# Patient Record
Sex: FEMALE | URBAN - METROPOLITAN AREA
[De-identification: names, ages, dates, MRNs, and addresses within clinical notes are randomized per-mention and may not be internally consistent; named-entity substitution may affect disease eponyms.]

---

## 2017-05-26 ENCOUNTER — HISTORICAL (OUTPATIENT)
Dept: ADMINISTRATIVE | Facility: HOSPITAL | Age: 32
End: 2017-05-26

## 2017-05-26 LAB
GLUCOSE 1H P 100 G GLC PO SERPL-MCNC: 129 MG/DL (ref 100–180)
GLUCOSE 2H P 100 G GLC PO SERPL-MCNC: 103 MG/DL (ref 70–140)
GLUCOSE 3H P 100 G GLC PO SERPL-MCNC: 59 MG/DL (ref 70–115)
GLUCOSE BS SERPL-MCNC: 81 MG/DL (ref 70–115)

## 2017-06-12 ENCOUNTER — HOSPITAL ENCOUNTER (OUTPATIENT)
Dept: OBSTETRICS AND GYNECOLOGY | Facility: HOSPITAL | Age: 32
End: 2017-06-12
Attending: OBSTETRICS & GYNECOLOGY | Admitting: OBSTETRICS & GYNECOLOGY

## 2017-08-04 ENCOUNTER — HOSPITAL ENCOUNTER (OUTPATIENT)
Dept: OBSTETRICS AND GYNECOLOGY | Facility: HOSPITAL | Age: 32
End: 2017-08-04
Attending: OBSTETRICS & GYNECOLOGY | Admitting: OBSTETRICS & GYNECOLOGY

## 2021-11-09 ENCOUNTER — HOSPITAL ENCOUNTER (OUTPATIENT)
Dept: OBSTETRICS AND GYNECOLOGY | Facility: HOSPITAL | Age: 36
End: 2021-11-12
Attending: OBSTETRICS & GYNECOLOGY | Admitting: OBSTETRICS & GYNECOLOGY

## 2021-11-09 LAB
ABS NEUT (OLG): 8.54 X10(3)/MCL (ref 2.1–9.2)
ALBUMIN SERPL-MCNC: 3.4 GM/DL (ref 3.5–5)
ALBUMIN/GLOB SERPL: 0.9 RATIO (ref 1.1–2)
ALP SERPL-CCNC: 70 UNIT/L (ref 40–150)
ALT SERPL-CCNC: 9 UNIT/L (ref 0–55)
APPEARANCE, UA: ABNORMAL
AST SERPL-CCNC: 14 UNIT/L (ref 5–34)
B-HCG SERPL QL: NEGATIVE
BACTERIA SPEC CULT: ABNORMAL /HPF
BASOPHILS # BLD AUTO: 0 X10(3)/MCL (ref 0–0.2)
BASOPHILS NFR BLD AUTO: 0 %
BILIRUB SERPL-MCNC: 0.4 MG/DL
BILIRUB UR QL STRIP: NEGATIVE
BILIRUBIN DIRECT+TOT PNL SERPL-MCNC: 0.2 MG/DL (ref 0–0.5)
BILIRUBIN DIRECT+TOT PNL SERPL-MCNC: 0.2 MG/DL (ref 0–0.8)
BUN SERPL-MCNC: 8.7 MG/DL (ref 7–18.7)
CALCIUM SERPL-MCNC: 9.4 MG/DL (ref 8.7–10.5)
CHLORIDE SERPL-SCNC: 104 MMOL/L (ref 98–107)
CO2 SERPL-SCNC: 25 MMOL/L (ref 22–29)
COLOR UR: ABNORMAL
CREAT SERPL-MCNC: 0.77 MG/DL (ref 0.55–1.02)
EOSINOPHIL # BLD AUTO: 0.1 X10(3)/MCL (ref 0–0.9)
EOSINOPHIL NFR BLD AUTO: 1 %
ERYTHROCYTE [DISTWIDTH] IN BLOOD BY AUTOMATED COUNT: 14.6 % (ref 11.5–17)
GLOBULIN SER-MCNC: 3.6 GM/DL (ref 2.4–3.5)
GLUCOSE (UA): NEGATIVE
GLUCOSE SERPL-MCNC: 131 MG/DL (ref 74–100)
HCT VFR BLD AUTO: 42.1 % (ref 37–47)
HGB BLD-MCNC: 13.4 GM/DL (ref 12–16)
HGB UR QL STRIP: NEGATIVE
KETONES UR QL STRIP: ABNORMAL
LEUKOCYTE ESTERASE UR QL STRIP: NEGATIVE
LYMPHOCYTES # BLD AUTO: 4.6 X10(3)/MCL (ref 0.6–4.6)
LYMPHOCYTES NFR BLD AUTO: 33 %
MCH RBC QN AUTO: 27 PG (ref 27–31)
MCHC RBC AUTO-ENTMCNC: 31.8 GM/DL (ref 33–36)
MCV RBC AUTO: 84.9 FL (ref 80–94)
MONOCYTES # BLD AUTO: 0.7 X10(3)/MCL (ref 0.1–1.3)
MONOCYTES NFR BLD AUTO: 5 %
MUCOUS THREADS URNS QL MICRO: ABNORMAL
NEUTROPHILS # BLD AUTO: 8.54 X10(3)/MCL (ref 2.1–9.2)
NEUTROPHILS NFR BLD AUTO: 61 %
NITRITE UR QL STRIP: NEGATIVE
PH UR STRIP: 5.5 [PH] (ref 5–9)
PLATELET # BLD AUTO: 336 X10(3)/MCL (ref 130–400)
PMV BLD AUTO: 11.5 FL (ref 9.4–12.4)
POTASSIUM SERPL-SCNC: 3.4 MMOL/L (ref 3.5–5.1)
PROT SERPL-MCNC: 7 GM/DL (ref 6.4–8.3)
PROT UR QL STRIP: NEGATIVE
RBC # BLD AUTO: 4.96 X10(6)/MCL (ref 4.2–5.4)
RBC #/AREA URNS HPF: ABNORMAL /[HPF]
SARS-COV-2 AG RESP QL IA.RAPID: NEGATIVE
SODIUM SERPL-SCNC: 139 MMOL/L (ref 136–145)
SP GR UR STRIP: 1.03 (ref 1–1.03)
SQUAMOUS EPITHELIAL, UA: ABNORMAL /HPF
UA WBC MAN: ABNORMAL /HPF
UROBILINOGEN UR STRIP-ACNC: 1
WBC # SPEC AUTO: 14 X10(3)/MCL (ref 4.5–11.5)

## 2021-11-10 LAB — FINAL CULTURE: NORMAL

## 2021-11-11 LAB
ERYTHROCYTE [DISTWIDTH] IN BLOOD BY AUTOMATED COUNT: 14.9 % (ref 11.5–17)
HCT VFR BLD AUTO: 29.1 % (ref 37–47)
HCT VFR BLD AUTO: 30.8 % (ref 37–47)
HGB BLD-MCNC: 9.3 GM/DL (ref 12–16)
HGB BLD-MCNC: 9.9 GM/DL (ref 12–16)
MCH RBC QN AUTO: 27.8 PG (ref 27–31)
MCHC RBC AUTO-ENTMCNC: 32.1 GM/DL (ref 33–36)
MCV RBC AUTO: 86.5 FL (ref 80–94)
PLATELET # BLD AUTO: 194 X10(3)/MCL (ref 130–400)
PMV BLD AUTO: 11.4 FL (ref 9.4–12.4)
RBC # BLD AUTO: 3.56 X10(6)/MCL (ref 4.2–5.4)
WBC # SPEC AUTO: 5.3 X10(3)/MCL (ref 4.5–11.5)

## 2021-11-12 LAB
ABS NEUT (OLG): 2.02 X10(3)/MCL (ref 2.1–9.2)
BASOPHILS # BLD AUTO: 0 X10(3)/MCL (ref 0–0.2)
BASOPHILS NFR BLD AUTO: 0 %
EOSINOPHIL # BLD AUTO: 0.3 X10(3)/MCL (ref 0–0.9)
EOSINOPHIL NFR BLD AUTO: 7 %
ERYTHROCYTE [DISTWIDTH] IN BLOOD BY AUTOMATED COUNT: 15 % (ref 11.5–17)
HCT VFR BLD AUTO: 29.9 % (ref 37–47)
HGB BLD-MCNC: 9.3 GM/DL (ref 12–16)
LYMPHOCYTES # BLD AUTO: 1.7 X10(3)/MCL (ref 0.6–4.6)
LYMPHOCYTES NFR BLD AUTO: 39 %
MCH RBC QN AUTO: 27.1 PG (ref 27–31)
MCHC RBC AUTO-ENTMCNC: 31.1 GM/DL (ref 33–36)
MCV RBC AUTO: 87.2 FL (ref 80–94)
MONOCYTES # BLD AUTO: 0.3 X10(3)/MCL (ref 0.1–1.3)
MONOCYTES NFR BLD AUTO: 6 %
NEUTROPHILS # BLD AUTO: 2.02 X10(3)/MCL (ref 2.1–9.2)
NEUTROPHILS NFR BLD AUTO: 46 %
PLATELET # BLD AUTO: 202 X10(3)/MCL (ref 130–400)
PMV BLD AUTO: 11.7 FL (ref 9.4–12.4)
RBC # BLD AUTO: 3.43 X10(6)/MCL (ref 4.2–5.4)
WBC # SPEC AUTO: 4.3 X10(3)/MCL (ref 4.5–11.5)

## 2022-04-30 NOTE — OP NOTE
Patient:   Rachell Trinidad            MRN: [id number]            FIN: [id number]               Age:   36 years     Sex:  Female     :  1985   Associated Diagnoses:   Endometriosis of both ovaries; Abdominal pain; Hemorrhagic ovarian cyst; Intractable abdominal pain; Ovarian cyst, complex; Pelvic pain in female   Author:   Jonathan Goss MD      Operative Note   Operative Information   Date/ Time:  11/10/2021 13:41:00.     Indications: Pelvic pain  Left ovarian cyst.     Preoperative Diagnosis: Endometriosis of both ovaries (XGA96-XN N80.1).     Postoperative Diagnosis: Abdominal pain (PNED [id number]), Endometriosis of both ovaries (WQP11-TA N80.1), Hemorrhagic ovarian cyst (XNY66-GZ N83.209), Intractable abdominal pain (GNI12-MH R10.9), Ovarian cyst, complex (NSC28-QI N83.299), Pelvic pain in female (HEK13-PG R10.2).     Surgeon: Jonathan Goss MD.     Speciman Removed: Left ovary and tube.     Description of Procedure/Findings/    Complications: The patient was brought to the operating room and placed on the operating table where she underwent a general anesthetic induction.  The patient was placed in a frog-leg position on the operating table.  The abdomen and perineum were then prepped and draped.  A uterine manipulator was then placed into the intrauterine cavity and the patient was then placed in the supine position.  Hess catheter was also placed into the bladder at this time.  An intraumbilical skin incision was then made.  A varies needle was inserted and 2 L of CO2 were insufflated into the abdominal cavity.  The varies needle was removed and a 10 mm trocar was placed through the umbilicus into the abdomen under direct visualization with the laparoscope.  Skin and third punctures were made in the right left lower abdominal quadrants with 5 mm trochars.  A fourth puncture was made suprapubically with a 10 mm trocar.  The pelvis was then inspected.  The uterus  was noted to be displaced anteriorly.  There was a large mass arising from the left ovary that was extending from the left pelvic sidewall across to the midline.  There was a large amount of hemosiderin laden fluid in the anterior and posterior cul-de-sacs as well as on the right abdominal gutter all the way up to the liver bed.  Autografts were taken to identify these findings.  The findings are very suggestive of endometriosis.  Inspection of the left ovary on the backside of the uterus where it was adhered revealed that there was some leakage of fluid from the ovary into the abdomen.  Cul-de-sac had numerous adhesions of the left ovary as well as the right ovary.  We then performed careful blunt and sharp dissection of all these adhesions of the left ovary to free it from the pelvic sidewall and the posterior aspect of the uterus.  We were able to capture the left gonadal ovary and vein with the bipolar cautery and then ligated the vessels with the harmonic scalpel.  We then dissected the left fallopian tube free.  We used the harmonic scalpel to dissected and separate the tube.  We then ligated the utero-ovarian pedicle on the left side.  We then carefully dissected the remainder of the ovary and left tube from the left pelvic sidewall.  The right ovary was inspected it was noted to have a large endometrioma which we drained as well.  Then inspected the pelvis carefully for hemostasis.  We used Vistaseal sprayed into the area of dissection for added hemostasis.  We then placed powdered Surgicel as well.  We retrieved the surgical specimen through the suprapubic trocar site.  After completing the procedure the instruments were removed from the abdomen.  The excess CO2 was expressed to the trochars.  The trochars were then removed.  The incisions were closed with 4-0 Monocryl in subcuticular stitches.  Dressings were placed.  The patient then awoke from the anesthetic and was transferred to the recovery room in  stable condition.

## 2022-04-30 NOTE — DISCHARGE SUMMARY
Patient:   Rachell Trinidad            MRN: [id number]            FIN: [id number]               Age:   36 years     Sex:  Female     :  1985   Associated Diagnoses:   None   Author:   Jonathan Goss MD      This patient was admitted to the hospital through the emergency room on Tuesday evening.  The patient had a large left ovarian cyst which was  found to be a large endometrioma.  Laparoscopic surgery was performed and a left salpingo-oophorectomy was achieved to remove the endometrioma.  Postoperatively the patient has done well she has had some pain over the last 48 hours.  The pressure has also been running and 95/50.  Repeat H&H's have been stable.  White count has dropped from 14,000-5000.  An ultrasound of the pelvis and abdomen was performed today which was negative.  Plan at this time is to discharge the patient to home in improved condition should be on a regular diet with limited activities.  She will have follow-up in the office on Wednesday.  Her discharge diagnosis abdominal pain left ovarian endometrioma.  Procedure performed laparoscopic left salpingo-oophorectomy

## 2022-04-30 NOTE — ED PROVIDER NOTES
Patient:   Rachell Trinidad            MRN: [id number]            FIN: [id number]               Age:   36 years     Sex:  Female     :  1985   Associated Diagnoses:   Hemorrhagic ovarian cyst; Intractable abdominal pain; Abdominal pain   Author:   Rony SUÁREZ, Brayan BEEBE      Report Summary       General:       Alert, no acute distress.       Basic Information   Additional information: Chief Complaint from Nursing Triage Note : Chief Complaint   2021 5:43 CST       Chief Complaint           pt. C/o lower bilat abd pain started last night worse this morning.. denies dysuria , fever, bleeding .. lbm yesterday  .      History of Present Illness   The patient presents with abdominal pain.  The onset was abrupt and Last night around 9 PM, acutely.  The course/duration of symptoms is constant and worsening.  The character of symptoms is sharp.  The degree at onset was minimal.  The Location of pain at onset was right, lower and abdominal.  The degree at present is severe.  The Location of pain at present is right and abdominal.  Radiating pain: right flank. There are exacerbating factors including changing position and movement.  Vomiting is not a relieving factor.  Therapy today: Tylenol this morning without any improvement..  Associated symptoms: none.  36-year-old female with a prior history of renal stone as well as status post gastric bypass here for right lower quadrant abdominal pain started at 9 PM last night abruptly, states that she was able to sleep for a few hours after taking Tylenol but this morning started having worsening pain around 4 AM and has been persistent since then, denies any nausea vomiting or fevers.  Does note that now the right lower quadrant pain is up into the right upper quadrant and radiating to the right flank with it out any changes in urinary habits..        Review of Systems   Constitutional symptoms:  Negative except as documented in HPI.   Skin symptoms:  Negative except as  documented in HPI.   Eye symptoms:  Negative except as documented in HPI.   ENMT symptoms:  Negative except as documented in HPI.   Respiratory symptoms:  Negative except as documented in HPI.   Cardiovascular symptoms:  Negative except as documented in HPI.   Gastrointestinal symptoms:  Negative except as documented in HPI.   Genitourinary symptoms:  Negative except as documented in HPI.   Musculoskeletal symptoms:  Negative except as documented in HPI.   Neurologic symptoms:  Negative except as documented in HPI.   Psychiatric symptoms:  Negative except as documented in HPI.   Endocrine symptoms:  Negative except as documented in HPI.   Hematologic/Lymphatic symptoms:  Negative except as documented in HPI.   Allergy/immunologic symptoms:  Negative except as documented in HPI.      Health Status   Allergies:    Allergic Reactions (Selected)  No Known Allergies,    Allergies (1) Active Reaction  No Known Allergies None Documented  .   Medications:  (Selected)   Inpatient Medications  Ordered  IVF Normal Saline NS Bolus 250ml 250 mL: 250 mL, 250 mL, IV, 250 mL/hr, pain, start date 21 5:46:00 CST, bolus dose: 250 mL, If BP <90/60  Documented Medications  Documented  Prenatal Multivitamins: Oral, Daily, 0 Refill(s).      Past Medical/ Family/ Social History   Medical history:    Resolved  Pregnant ([id number]): Onset on 2016 at 31 years.  Resolved on 2017 at 32 years..   Surgical history:     delivery only; (52020) on 2017 at 32 Years.   Section (None) on 2017 at 32 Years.  Comments:  2017 16:26 CDT - Sonny BRODERICK, Ruth EVANS  auto-populated from documented surgical case  breast aug..   Family history:    Primary malignant neoplasm of lung  Mother  Skin cancer  Father  Mother  .   Social history:    Social & Psychosocial Habits    Alcohol  2017  Use: Never    Substance Use  2017  Use: Never    Tobacco  2017  Use: Never smoker  .   Problem list:    No  qualifying data available  .      Physical Examination               Vital Signs   Vital Signs   11/9/2021 10:30 CST      Peripheral Pulse Rate     87 bpm                             Respiratory Rate          18 br/min                             SpO2                      99 %                             Systolic Blood Pressure   126 mmHg                             Diastolic Blood Pressure  74 mmHg                             Mean Arterial Pressure, Cuff              91 mmHg    11/9/2021 9:05 CST       Peripheral Pulse Rate     93 bpm                             Respiratory Rate          22 br/min                             SpO2                      100 %                             Systolic Blood Pressure   112 mmHg                             Diastolic Blood Pressure  61 mmHg                             Mean Arterial Pressure, Cuff              78 mmHg    11/9/2021 8:32 CST       Temperature Oral          36.6 DegC                             Temperature Oral (calculated)             97.88 DegF                             Peripheral Pulse Rate     86 bpm                             Respiratory Rate          24 br/min                             SpO2                      100 %                             Systolic Blood Pressure   129 mmHg                             Diastolic Blood Pressure  58 mmHg  LOW                             Mean Arterial Pressure, Cuff              82 mmHg    11/9/2021 5:43 CST       Temperature Oral          36.5 DegC                             Temperature Oral (calculated)             97.70 DegF                             Peripheral Pulse Rate     88 bpm                             Respiratory Rate          18 br/min                             SpO2                      98 %                             Systolic Blood Pressure   99 mmHg                             Diastolic Blood Pressure  61 mmHg  .      Vital Signs (last 24 hrs)_____  Last Charted___________  Temp Oral     36.6 DegC   (NOV 09 08:32)  Heart Rate Peripheral   87 bpm  (NOV 09 10:30)  Resp Rate         18 br/min  (NOV 09 10:30)  SBP      126 mmHg  (NOV 09 10:30)  DBP      74 mmHg  (NOV 09 10:30)  SpO2      99 %  (NOV 09 10:30)  .   Basic Oxygen Information   11/9/2021 10:30 CST      SpO2                      99 %    11/9/2021 9:05 CST       SpO2                      100 %    11/9/2021 8:32 CST       SpO2                      100 %    11/9/2021 5:43 CST       SpO2                      98 %  .   General:  Alert, no acute distress.    Skin:  Warm, dry, no rash.    Head:  Normocephalic, atraumatic.    Neck:  Supple, trachea midline, no JVD.    Eye:  Pupils are equal, round and reactive to light, extraocular movements are intact, normal conjunctiva, vision unchanged.    Ears, nose, mouth and throat:  Tympanic membranes clear, oral mucosa moist, no pharyngeal erythema or exudate.    Cardiovascular:  Regular rate and rhythm, No murmur, No edema.    Respiratory:  Lungs are clear to auscultation, respirations are non-labored, breath sounds are equal, Symmetrical chest wall expansion.    Chest wall:  No tenderness.   Back:  Normal range of motion.   Musculoskeletal:  Normal ROM, normal strength, no tenderness.    Gastrointestinal:  Soft, Non distended, No organomegaly, Tenderness: Moderate, right flank, right upper quadrant, right lower quadrant, Guarding: Negative, Rebound: Negative, Bowel sounds: Normal.    Neurological:  Alert and oriented to person, place, time, and situation, No focal neurological deficit observed, CN II-XII intact, normal sensory observed, normal motor observed, normal speech observed.    Psychiatric:  Cooperative, appropriate mood & affect, normal judgment.             Medical Decision Making   Differential Diagnosis:  Abdominal pain, Appendicitis, bowel obstruction, renal stone, ureteral stone, biliary colic, cholecystitis, hepatitis, pancreatitis, urinary tract infection, pyelonephritis, diverticulitis, ectopic  pregnancy.    Rationale:   Young female with a history of nephrolithiasis and status post gastric bypass here for right lower quadrant pain that now radiates to the right upper quadrant and to the right flank.  R states that this does feel similar to her prior renal colic.  All signs stable, afebrile.  Exam rather tender in those areas as above.  Labs with negative UPT, likely dirty catch UA, mild leukocytosis but otherwise unremarkable lab work.  CT of the abdomen pelvis with left adnexal mass favored to represent dermoid and cannot exclude torsion, small amount of free fluid scattered in the abdomen pelvis of which this left-sided pathology does not match up with patient's right-sided symptoms so pelvic ultrasound ordered as well.  Required IV fluids, multiple rounds of IV narcotics for pain control as well as antiemetics.  Ultrasound revealed Small cyst of the right ovary and a dermoid on the left ovary with flow present to both ovaries.  She is still having significant can pain as well as rather tender on abdominal exam.  Will admit to observation with her obstetrician Dr. Goss..   Results review:     Labs (Last four charted values)  WBC                  H 14.0 (NOV 09)   Hgb                  13.4 (NOV 09)   Hct                  42.1 (NOV 09)   Plt                  336 (NOV 09)   Na                   139 (NOV 09)   K                    L 3.4 (NOV 09)   CO2                  25 (NOV 09)   Cl                   104 (NOV 09)   Cr                   0.77 (NOV 09)   BUN                  8.7 (NOV 09)   Glucose Random       H 131 (NOV 09) .   Radiology results:  Rad Results (ST)  < 12 hrs   Accession: [id number]  Order: CT Abdomen and Pelvis W W/O Contrast  Report Dt/Tm: 11/09/2021 10:16  Report:      History.  Right-sided pain.     Reference.  No priors     Technique.  Helical acquisition through the abdomen and pelvis without and with IV  contrast. Three plane reconstructions were provided for review. DLP  7119  mGycm. Automatic exposure control, adjustment of mA/kV or  iterative reconstruction technique was used to reduce radiation.     Findings.  Mild atelectasis at the lung bases.     There is some focal hepatic steatosis along the falciform ligament. No  significant abnormality of the gallbladder, spleen, pancreas or  adrenals. No renal calculi. Tiny renal hypodensities cannot be further  characterized.      No bowel obstruction. No significant inflammatory changes of the  bowel. The visualized portion of the appendix appears normal.  Postsurgical changes of the stomach.     Urinary bladder unremarkable. There is left adnexal solid and cystic  mass. This contains gross fat and calcifications. Overall this  measures 7.5 x 5 cm. Right adnexa is also heterogeneous. Small amount  of free fluid scattered in the abdomen and pelvis including around the  liver and spleen. Abdominal aorta normal in caliber.     No acute osseous findings.     Impression.  1. Left adnexal mass is favored to represent dermoid. I cannot exclude  torsion of this mass. Recommend correlation with pelvic ultrasound.  2. Small amount of free fluid scattered in the abdomen and pelvis.               .      Reexamination/ Reevaluation   Vital signs   Basic Oxygen Information   11/9/2021 10:30 CST      SpO2                      99 %    11/9/2021 9:05 CST       SpO2                      100 %    11/9/2021 8:32 CST       SpO2                      100 %    11/9/2021 5:43 CST       SpO2                      98 %        Impression and Plan   Diagnosis   Hemorrhagic ovarian cyst (NOK26-RS N83.209)   Intractable abdominal pain (GKM72-GQ R10.9)   Abdominal pain (PNED [id number])      Calls-Consults   -  11/9/2021 13:04:00 , Jonathan Goss MD   Condition: Improved, Stable.    Disposition: Place in Observation Unit.    Counseled: Patient, Family, Regarding diagnosis, Regarding diagnostic results, Regarding treatment plan, Patient  indicated understanding of instructions.    Notes: Patient examined and note completed without assistance of scribe..

## 2022-04-30 NOTE — H&P
Patient:   Rachell Trinidad            MRN: [id number]            FIN: [id number]               Age:   36 years     Sex:  Female     :  1985   Associated Diagnoses:   None   Author:   Jonathan Goss MD      Basic Information   Source of history:  Self.    Referral source:  Self.       Chief Complaint   2021 5:43 CST       pt. C/o lower bilat abd pain started last night worse this morning.. denies dysuria , fever, bleeding .. lbm yesterday        History of Present Illness    presented to ER with increasing abdominal pain. Workup in the ER reveals a left ovarian dermoid. with possible torsion.        Review of Systems   this patient is having no fever. She has normal bowel movements. She has normal urinary symptoms.      Health Status   Allergies:    Allergic Reactions (Selected)  No Known Allergies,    Allergies (1) Active Reaction  No Known Allergies None Documented     Current medications:  (Selected)   Inpatient Medications  Ordered  Dilaudid: 2 mg, form: Injection, IV Push, q4hr PRN for pain, first dose 21 17:18:00 CST  IVF Normal Saline NS Bolus 250ml 250 mL: 250 mL, 250 mL, IV, 250 mL/hr, pain, start date 21 5:46:00 CST, bolus dose: 250 mL, If BP <90/60  Lactated Ringers Injection intravenous solution 1,000 mL: 1,000 mL, 1,000 mL, IV, 125 mL/hr, start date 21 16:18:00 CST  Phenergan: 25 mg, form: Injection, IM, q4hr PRN for nausea, first dose 21 17:18:00 CST  Toradol: 30 mg, form: Injection, IV Push, q6hr, Order duration: 5 day(s), first dose 21 17:00:00 CST, stop date 21 16:59:00 CST  Documented Medications  Documented  Adderall XR 30 mg oral capsule, extended release: 30 mg = 1 cap(s), Oral, Daily, 0 Refill(s)  Lexapro 20 mg oral tablet: 20 mg = 1 tab(s), Oral, Daily, # 30 tab(s), 0 Refill(s)  Prenatal Multivitamins: Oral, Daily, 0 Refill(s)  Xanax 0.5 mg oral tablet: 0.5 mg = 1 tab(s), Oral, TID, PRN PRN for anxiety, # 30 tab(s), 0 Refill(s),     Medications (5) Active  Scheduled: (2)  ketorolac 30 mg/mL Sol  30 mg 1 mL, IV Push, q6hr  sodium chloride 0.9% 250 mL  250 mL, IV  Continuous: (1)  lactated ringers 1,000 mL  1,000 mL, IV, 125 mL/hr  PRN: (2)  hydromorphone 2 mg/ml Inj (per mL)  2 mg 1 mL, IV Push, q4hr  promethazine 25 mg/mL Inj  25 mg 1 mL, IM, q4hr     Problem list:    No qualifying data available        Histories   Past Medical History:    Resolved  Pregnant ([id number]): Onset on 2016 at 31 years.  Resolved on 2017 at 32 years.   Family History:    Primary malignant neoplasm of lung  Mother  Skin cancer  Father  Mother     Procedure history:     delivery only; (CPT4 73341) on 2017 at 32 Years.   Section (None) performed by Ana Paula SUÁREZ, Jonathan TYLER on 2017 at 32 Years.  Comments:  2017 16:26 [place] - Sonny BRODERICK, Ruth EVANS  auto-populated from documented surgical case  breast aug., she also has a history of an abnormal pattern the past she had cryosurgery in  of the cervix   Social History        Social & Psychosocial Habits    Alcohol  2017  Use: Never    Substance Use  2017  Use: Never    Tobacco  2017  Use: Never smoker    2021  Use: Never (less than 100 in l    Patient Wants Consult For Cessation Counseling N/A    Abuse/Neglect  2021  SHX Any signs of abuse or neglect No    Feels unsafe at home: No    Safe place to go: Yes  .        Physical Examination   Vital Signs   2021 13:25 CST      Peripheral Pulse Rate     92 bpm                             Respiratory Rate          15 br/min                             SpO2                      98 %                             Systolic Blood Pressure   109 mmHg                             Diastolic Blood Pressure  67 mmHg                             Mean Arterial Pressure, Cuff              81 mmHg    2021 12:46 CST      Temperature Oral          36.8 DegC                             Temperature Oral (calculated)              98.24 DegF                             Peripheral Pulse Rate     97 bpm                             Respiratory Rate          20 br/min                             SpO2                      100 %                             Systolic Blood Pressure   111 mmHg                             Diastolic Blood Pressure  62 mmHg    11/9/2021 10:30 CST      Peripheral Pulse Rate     87 bpm                             Respiratory Rate          18 br/min                             SpO2                      99 %                             Systolic Blood Pressure   126 mmHg                             Diastolic Blood Pressure  74 mmHg                             Mean Arterial Pressure, Cuff              91 mmHg    11/9/2021 9:05 CST       Peripheral Pulse Rate     93 bpm                             Respiratory Rate          22 br/min                             SpO2                      100 %                             Systolic Blood Pressure   112 mmHg                             Diastolic Blood Pressure  61 mmHg                             Mean Arterial Pressure, Cuff              78 mmHg    11/9/2021 8:32 CST       Temperature Oral          36.6 DegC                             Temperature Oral (calculated)             97.88 DegF                             Peripheral Pulse Rate     86 bpm                             Respiratory Rate          24 br/min                             SpO2                      100 %                             Systolic Blood Pressure   129 mmHg                             Diastolic Blood Pressure  58 mmHg  LOW                             Mean Arterial Pressure, Cuff              82 mmHg    11/9/2021 5:43 CST       Temperature Oral          36.5 DegC                             Temperature Oral (calculated)             97.70 DegF                             Peripheral Pulse Rate     88 bpm                             Respiratory Rate          18 br/min                             SpO2                       98 %                             Systolic Blood Pressure   99 mmHg                             Diastolic Blood Pressure  61 mmHg        Vital Signs (last 24 hrs)_____  Last Charted___________  Temp Oral     36.8 DegC  (NOV 09 12:46)  Heart Rate Peripheral   92 bpm  (NOV 09 13:25)  Resp Rate         15 br/min  (NOV 09 13:25)  SBP      109 mmHg  (NOV 09 13:25)  DBP      67 mmHg  (NOV 09 13:25)  SpO2      98 %  (NOV 09 13:25)     Measurements from flowsheet : Measurements   11/9/2021 13:30 CST      Weight Measured and Calculated in Lbs     171.96 lb     the patient's head eyes ears nose and throatphysical exathin normal liits  heart showedrate and rhythm no murmurs or gallops  Lungs showed vesibreath sounds bilaterally with no wheezes or rales heard  abdomen was softnondistended bowel sounds are positiveno masses palpated there is no tenderness  xternal genitala were normal  Vagina clear  Cervix closed  Uterus = axial, normal size slightly tender  Ovaries= leeft adnexa tender enlarged,   rectum=no masses felt  Extremities= no edema pulses equal bilaterallyno cyanosis defect      Health Maintenance      Health Maintenance     Pending (in the next year)        OverDue           Obesity Screening due  01/01/21  and every 1  year(s)           Alcohol Misuse Screening due  01/02/21  and every 1  year(s)        Due            ADL Screening due  11/09/21  and every 1  year(s)           Tetanus Vaccine due  11/09/21  and every 10  year(s)     Satisfied (in the past 1 year)        Satisfied            Blood Pressure Screening on  11/09/21.  Satisfied by Jenise Paredes RN           Cervical Cancer Screening on  11/16/20.  Satisfied by Keren Brand          Review / Management   Results review:     Labs (Last four charted values)  WBC                  H 14.0 (NOV 09)   Hgb                  13.4 (NOV 09)   Hct                  42.1 (NOV 09)   Plt                  336 (NOV 09)   Na                   139 (NOV  09)   K                    L 3.4 (NOV 09)   CO2                  25 (NOV 09)   Cl                   104 (NOV 09)   Cr                   0.77 (NOV 09)   BUN                  8.7 (NOV 09)   Glucose Random       H 131 (NOV 09) .    Radiology results   Rad Results (ST)   Accession: [id number]  Order: US Pelvic Non-OB w Transvag if needed  Report Dt/Tm: 11/09/2021 12:22  Report:   Ultrasound of the pelvis     Exam is performed transabdominally only     Clinical history is masses on CT scan.     This is compared to a previous CT scan from earlier in the day.     The uterus measures 8.3 x 4.3 x 6 cm. Endometrial stripe is measured  at 1.1 cm.     The right ovary measures 3.6 x 2.7 x 3.2 cm. There is a cyst present  measuring 2 x 1.9 x 1.5 cm. There is flow present.     The left ovary measures 9.2 x 5.3 x 9.6 cm. There is a complex mass  with an echogenic portion measuring 3.5 x 3.4 x 3 cm. There is  echogenic material suspicious for calcification. This is most  consistent with a dermoid. There is flow present.     IMPRESSION: Small cyst of the right ovary.     Dermoid on the left ovary. There is flow present to both ovaries.    Accession: [id number]  Order: CT Abdomen and Pelvis W W/O Contrast  Report Dt/Tm: 11/09/2021 10:16  Report:      History.  Right-sided pain.     Reference.  No priors     Technique.  Helical acquisition through the abdomen and pelvis without and with IV  contrast. Three plane reconstructions were provided for review. DLP  1377 mGycm. Automatic exposure control, adjustment of mA/kV or  iterative reconstruction technique was used to reduce radiation.     Findings.  Mild atelectasis at the lung bases.     There is some focal hepatic steatosis along the falciform ligament. No  significant abnormality of the gallbladder, spleen, pancreas or  adrenals. No renal calculi. Tiny renal hypodensities cannot be further  characterized.      No bowel obstruction. No significant inflammatory changes of  the  bowel. The visualized portion of the appendix appears normal.  Postsurgical changes of the stomach.     Urinary bladder unremarkable. There is left adnexal solid and cystic  mass. This contains gross fat and calcifications. Overall this  measures 7.5 x 5 cm. Right adnexa is also heterogeneous. Small amount  of free fluid scattered in the abdomen and pelvis including around the  liver and spleen. Abdominal aorta normal in caliber.     No acute osseous findings.     Impression.  1. Left adnexal mass is favored to represent dermoid. I cannot exclude  torsion of this mass. Recommend correlation with pelvic ultrasound.  2. Small amount of free fluid scattered in the abdomen and pelvis.                     Impression and Plan   pelvic pain,left ovarian dermoid    Admit for laparoscopy with possible Left oophorectomy